# Patient Record
(demographics unavailable — no encounter records)

---

## 2017-11-22 NOTE — EKG
Test Reason : 

Blood Pressure : ***/*** mmHG

Vent. Rate : 087 BPM     Atrial Rate : 087 BPM

   P-R Int : 172 ms          QRS Dur : 074 ms

    QT Int : 398 ms       P-R-T Axes : 062 051 058 degrees

   QTc Int : 478 ms

 

NORMAL SINUS RHYTHM

NORMAL ECG

NO PREVIOUS ECGS AVAILABLE

Confirmed by KIARRA WILLIAM MD ([id number]) on 11/22/2017 12:10:21 PM

 

Referred By:             Confirmed By:KIARRA WILLIAM MD

## 2017-11-22 NOTE — PDOC
History of Present Illness





- General


History Source: Patient


Exam Limitations: No Limitations





- History of Present Illness


Initial Comments: 


11/22/17 05:01


The patient is a 59 year old female with a history of cancer who presents to 

the emergency department with headache, nausea, and vomiting for one day. The 

patient states on monday she went for radiation therapy at [facility]. 

Yesterday the patient began to feel nauseous and lethargic and was unable to 

keep any food down The patient notes that she began to develop a pounding 

headache, global in nature. She denies any other complaints at this time 








<Golden Garcia - Last Filed: 11/22/17 05:01>





<Nahed Chapman - Last Filed: 11/22/17 08:47>





- General


History Source: Patient





<Ra Almazan - Last Filed: 11/22/17 20:01>





- General


Stated Complaint: HEADACHE


Time Seen by Provider: 11/22/17 04:47





NIH Stroke Scale





- Last Known Well Date/Time & Onset


Date Last Known Well: 11/21/17


Time Last Known Well: 07:09





- Initial Evaluation


Level of consciousness: Alert


Ask patient the month and their age: Answers both correctly


Ask patient to open & close eyes; make fist and let go: Obeys both correctly


Best gaze (horizontal eye movement): Normal


Visual field testing: No visual field loss


Facial paresis (Show teeth/raise eyebrows/close eyes tight): Normal symmetrical 

movement


Motor Function: Left Arm: Normal


Motor Function:  Right Arm: Normal (extends arm 90 (or 45) degrees for 10 

seconds without drift


Motor Function:  Left Leg: Normal (extends leg 30 degrees for 5 seconds without 

drift)


Motor Function:  Right Leg: Normal (extends leg 30 degrees for 5 seconds 

without drift)


Limb Ataxia: No ataxia


Sensory(Use pinprick test arms,legs,trunk,face/side to side): Normal


Best language (Describe picture, name items, read sentences): No Aphasia


Dysarthria (read several words): Normal articulation


Extinction and Inattention: No abnormality





- Total Score


NIH Stroke Scale Score: 0





<Ra Almazan - Last Filed: 11/22/17 20:01>





tPA Exclusion Checklist 0-3hr





- Time Elapsed


Date last known well: 11/21/17


Time last known well: 07:10


Elaspsed time: 1 Day(s) and 12 Hour(s) and 50 Minutes 





- Thrombolytic Therapy Candidate


Is the patient eligible for Thrombolytic Therapy?: No





- Exclusion Criteria 0-3hr


SBP greater than 185 or DBP greater than 110mmHg despite tx: No


Hx of previous IC hemorrhage, IC neoplasm, AVM or aneurysm: Yes


Active internal bleeding: No


Blding diathesis(low plt ct, inc PTT,INR>1.7 or use of NOAC): No


Symptoms suggest subarachnoid hemorrhage: No


CT demonstrates multilobar infarct(>1/3 cerebral hemiphere): No


Arterial puncture at noncompressible site in previous 7 days: No


Blood glucose concentration less than 50mg/dL (2.7mmol/L): No





- Relative Exclusion Criteria 0-3h


Life expectancy <1yr/severe co-morbid illness/CMO on admit: No


Pregnancy: No


Patient/family refused: No


Rapid improvement: No


Stroke severity too mild: No


Recent acute MI (w/in previous 3 months): No


Seizure at onset with postictal residual neuro impairments: No


Major surgery or serious trauma w/in previous 14 days: No


Recent GI or  hemorrhage (w/in previous 21 days): No





- Ineligibility reason(s)


Reasons No tPA given: Outside of window - delayed arrival (pt has 

intraparemchyal bleeding)





<Ra Almazan - Last Filed: 11/22/17 20:01>





Past History





<Golden Garcia - Last Filed: 11/22/17 05:01>





<Nahed Chapman - Last Filed: 11/22/17 08:47>





<Ra Almazan - Last Filed: 11/22/17 20:01>





- Past Medical History


Allergies/Adverse Reactions: 


 Allergies











Allergy/AdvReac Type Severity Reaction Status Date / Time


 


No Known Allergies Allergy   Verified 11/22/17 05:38














**Review of Systems





- Review of Systems


Able to Perform ROS?: Yes


Comments:: 


11/22/17 05:01


CONSTITUTIONAL:


Absent: fever, no chills, no fatigue


EYES:


Absent: visual changes


ENT:


Absent: ear pain, no sore throat


CARDIOVASCULAR:


Absent: chest pain, no palpitations


RESPIRATORY:


Absent: cough, no SOB


GI:


(+) Nausea, vomiting 


Absent: abdominal pain, no constipation, no diarrhea


GENITOURINARY:


Absent: dysuria, no frequency, no hematuria


MUSCULOSKELETAL:


Absent: back pain, no arthralgia, no myalgia


SKIN:


Absent: rash


NEURO: 


(+) Headache








<Golden Garcia - Last Filed: 11/22/17 05:01>





*Physical Exam





- Vital Signs


 Last Vital Signs











Temp Pulse Resp BP Pulse Ox


 


 98.3 F   73   18   114/80   100 


 


 11/22/17 04:56  11/22/17 04:56  11/22/17 04:56  11/22/17 04:56  11/22/17 04:56














- Physical Exam


Comments: 


11/22/17 05:01


GENERAL: 


(+) Well-appearing, well-nourished. Mild distress.


HEENT: 


Normocephalic, atraumatic. PERRL, EOM intact.


CARDIOVASCULAR: 


Normal S1, S2. Regular rate and rhythm.


PULMONARY: 


Clear to auscultation bilaterally.


ABDOMEN: 


Soft, non-distended, non-tender. 


EXTREMITIES: 


Normal ROM in all four extremities. No gross deformities.


SKIN: 


Warm, dry.  No rash


NEUROLOGICAL: 


No focal neurological deficits. 








<Golden Garcia - Last Filed: 11/22/17 05:01>





- Vital Signs


 Last Vital Signs











Temp Pulse Resp BP Pulse Ox


 


 98.3 F   86   18   110/69   98 


 


 11/22/17 07:00  11/22/17 07:00  11/22/17 07:00  11/22/17 07:00  11/22/17 07:00














<Nahed Chapman - Last Filed: 11/22/17 08:47>





ED Treatment Course





- LABORATORY


CBC & Chemistry Diagram: 


 11/22/17 05:37





 11/22/17 05:37





- ADDITIONAL ORDERS


Additional order review: 


 Laboratory  Results











  11/22/17 11/22/17 11/22/17





  07:35 05:37 05:37


 


PT with INR    11.80


 


INR    1.04


 


Sodium   142 


 


Potassium   3.7 


 


Chloride   106 


 


Carbon Dioxide   29 


 


Anion Gap   7 L 


 


BUN   16 


 


Creatinine   0.8 


 


Creat Clearance w eGFR   > 60 


 


Random Glucose   116 H 


 


Calcium   8.3 L 


 


Magnesium   1.7 L 


 


Total Bilirubin   0.2 


 


AST   8 L 


 


ALT   19 


 


Alkaline Phosphatase   72 


 


Total Protein   6.2 L 


 


Albumin   3.0 L 


 


Lipase   404 H 


 


Urine Color  Ltyellow  


 


Urine Appearance  Slcloudy  


 


Urine pH  7.0  


 


Ur Specific Gravity  1.014  


 


Urine Protein  Negative  


 


Urine Glucose (UA)  Negative  


 


Urine Ketones  Negative  


 


Urine Blood  Negative  


 


Urine Nitrite  Negative  


 


Urine Bilirubin  Negative  


 


Urine Urobilinogen  Negative  


 


Blood Type   


 


Antibody Screen   














  11/22/17





  05:37


 


PT with INR 


 


INR 


 


Sodium 


 


Potassium 


 


Chloride 


 


Carbon Dioxide 


 


Anion Gap 


 


BUN 


 


Creatinine 


 


Creat Clearance w eGFR 


 


Random Glucose 


 


Calcium 


 


Magnesium 


 


Total Bilirubin 


 


AST 


 


ALT 


 


Alkaline Phosphatase 


 


Total Protein 


 


Albumin 


 


Lipase 


 


Urine Color 


 


Urine Appearance 


 


Urine pH 


 


Ur Specific Gravity 


 


Urine Protein 


 


Urine Glucose (UA) 


 


Urine Ketones 


 


Urine Blood 


 


Urine Nitrite 


 


Urine Bilirubin 


 


Urine Urobilinogen 


 


Blood Type  B POSITIVE


 


Antibody Screen  Negative








 











  11/22/17





  05:37


 


RBC  4.18


 


MCV  88.6


 


MCHC  32.8


 


RDW  16.0 H


 


MPV  7.2 L


 


Neutrophils %  75.4


 


Lymphocytes %  16.4


 


Monocytes %  7.1


 


Eosinophils %  0.6


 


Basophils %  0.5














- Medications


Given in the ED: 


ED Medications














Discontinued Medications














Generic Name Dose Route Start Last Admin





  Trade Name Freq  PRN Reason Stop Dose Admin


 


Hydromorphone HCl  1 mg  11/22/17 05:36  11/22/17 05:49





  Dilaudid Injection -  IVPUSH  11/22/17 05:37  1 mg





  ONCE ONE   Administration


 


Morphine Sulfate  6 mg  11/22/17 04:51  11/22/17 05:13





  Morphine Sulfate  IVPUSH  11/22/17 04:52  6 mg





  ONCE ONE   Administration


 


Ondansetron HCl  4 mg  11/22/17 04:51  11/22/17 05:13





  Zofran Injection  IVPUSH  11/22/17 04:52  4 mg





  ONCE STA   Administration


 


Ondansetron HCl  4 mg  11/22/17 05:35  11/22/17 05:49





  Zofran Injection  IVPUSH  11/22/17 05:36  4 mg





  ONCE STA   Administration














<Nahed Chapman - Last Filed: 11/22/17 08:47>





- LABORATORY


CBC & Chemistry Diagram: 


 11/22/17 05:37





 11/22/17 05:37





<Ra Almazan - Last Filed: 11/22/17 20:01>





Medical Decision Making





- Medical Decision Making








11/22/17 08:47


Stat team at bedside to transport to [facility]. 





<Nahed Chapman - Last Filed: 11/22/17 08:47>





- Medical Decision Making





11/22/17 06:39


Pt feels better at this time.  Awaiting Ct scan results of brain.  Pending 

disposition.  





<Ra Almazan - Last Filed: 11/22/17 20:01>





*DC/Admit/Observation/Transfer





- Attestations


Scribe Attestion: 


11/22/17 05:01


Documentation prepared by Golden Garcia, acting as medical scribe for Ra Almazan DO.





<Golden Garcia - Last Filed: 11/22/17 05:01>





- Discharge Dispostion


Admit: No





- Transfer to Acute Care Facility


Receiving Facility: [facility].


Accepting Physician:: Rowan


Transfer comment: 





Accepted to [facility] by Dr. Donahue. 








<Nahed Chapman - Last Filed: 11/22/17 08:47>





- Discharge Dispostion


Admit: No





- Transfer to Acute Care Facility


Receiving Facility: [facility]. (transferred for Nerosurgical 

evaluation and care)





<Ra Almazan - Last Filed: 11/22/17 20:01>


Diagnosis at time of Disposition: 


Intraparenchymal hematoma of brain


Qualifiers:


 Encounter type: initial encounter Laterality: left Loss of consciousness 

presence/duration: without LOC Qualified Code(s): S06.350A - Traumatic 

hemorrhage of left cerebrum without loss of consciousness, initial encounter








- Discharge Dispostion


Disposition: TRANSFER ACUTE CARE/OTHER HOSP


Condition at time of disposition: Good